# Patient Record
Sex: FEMALE | Race: OTHER | Employment: OTHER | URBAN - METROPOLITAN AREA
[De-identification: names, ages, dates, MRNs, and addresses within clinical notes are randomized per-mention and may not be internally consistent; named-entity substitution may affect disease eponyms.]

---

## 2022-03-14 ENCOUNTER — NEW PATIENT (OUTPATIENT)
Dept: URBAN - METROPOLITAN AREA CLINIC 21 | Facility: CLINIC | Age: 72
End: 2022-03-14

## 2022-03-14 DIAGNOSIS — H35.363: ICD-10-CM

## 2022-03-14 DIAGNOSIS — H02.834: ICD-10-CM

## 2022-03-14 DIAGNOSIS — H26.492: ICD-10-CM

## 2022-03-14 DIAGNOSIS — H02.831: ICD-10-CM

## 2022-03-14 PROCEDURE — 92285 EXTERNAL OCULAR PHOTOGRAPHY: CPT

## 2022-03-14 PROCEDURE — 92004 COMPRE OPH EXAM NEW PT 1/>: CPT

## 2022-03-14 PROCEDURE — 92134 CPTRZ OPH DX IMG PST SGM RTA: CPT

## 2022-03-14 ASSESSMENT — TONOMETRY
OD_IOP_MMHG: 17
OS_IOP_MMHG: 18

## 2022-03-14 ASSESSMENT — VISUAL ACUITY
OD_SC: 20/40-2
OS_SC: 20/40-2

## 2022-03-14 NOTE — PATIENT DISCUSSION
"- Miss saw patient on 3/17/22 : Patient wouldn't even let VIP take a photo if she had to pay anything out of pocket. [address] tried to explain the brow part to the patientbut she says no, she only wants a little skin taken off and her dr Kaia Dominguez hometown"" told her never to never to do a brow lift. But patient does ant to do a medical BULB at surgery center. "

## 2022-03-21 ENCOUNTER — FOLLOW UP (OUTPATIENT)
Dept: URBAN - METROPOLITAN AREA CLINIC 21 | Facility: CLINIC | Age: 72
End: 2022-03-21

## 2022-03-21 DIAGNOSIS — H02.834: ICD-10-CM

## 2022-03-21 DIAGNOSIS — H02.831: ICD-10-CM

## 2022-03-21 PROCEDURE — 99213 OFFICE O/P EST LOW 20 MIN: CPT

## 2022-03-21 ASSESSMENT — VISUAL ACUITY
OS_SC: 20/20
OU_CC: 20/20
OD_SC: 20/40-2

## 2022-03-21 ASSESSMENT — TONOMETRY
OS_IOP_MMHG: 18
OD_IOP_MMHG: 19

## 2022-03-21 NOTE — PATIENT DISCUSSION
** THE PATIENT IS GOING TO HOLD OFF ON SURGERY FOR NOW.  SHE DID NOT UNDERSTAND THE CHARGES FOR HER OOP.

## 2022-03-21 NOTE — PATIENT DISCUSSION
"- Miss saw patient on 3/17/22 : Patient wouldn't even let VIP take a photo if she had to pay anything out of pocket. [address] tried to explain the brow part to the patientbut she says no, she only wants a little skin taken off and her dr Mandy Castanon hometown"" told her never to never to do a brow lift. But patient does ant to do a medical BULB at surgery center. "